# Patient Record
Sex: MALE | Race: WHITE | NOT HISPANIC OR LATINO
[De-identification: names, ages, dates, MRNs, and addresses within clinical notes are randomized per-mention and may not be internally consistent; named-entity substitution may affect disease eponyms.]

---

## 2017-10-03 ENCOUNTER — RX RENEWAL (OUTPATIENT)
Age: 37
End: 2017-10-03

## 2018-01-18 ENCOUNTER — APPOINTMENT (OUTPATIENT)
Dept: FAMILY MEDICINE | Facility: CLINIC | Age: 38
End: 2018-01-18
Payer: COMMERCIAL

## 2018-01-18 ENCOUNTER — RECORD ABSTRACTING (OUTPATIENT)
Age: 38
End: 2018-01-18

## 2018-01-18 VITALS
OXYGEN SATURATION: 97 % | DIASTOLIC BLOOD PRESSURE: 82 MMHG | HEIGHT: 71 IN | WEIGHT: 151 LBS | BODY MASS INDEX: 21.14 KG/M2 | TEMPERATURE: 99.1 F | RESPIRATION RATE: 16 BRPM | HEART RATE: 94 BPM | SYSTOLIC BLOOD PRESSURE: 158 MMHG

## 2018-01-18 DIAGNOSIS — K22.70 BARRETT'S ESOPHAGUS W/OUT DYSPLASIA: ICD-10-CM

## 2018-01-18 DIAGNOSIS — K57.90 DIVERTICULOSIS OF INTESTINE, PART UNSPECIFIED, W/OUT PERFORATION OR ABSCESS W/OUT BLEEDING: ICD-10-CM

## 2018-01-18 DIAGNOSIS — G47.33 OBSTRUCTIVE SLEEP APNEA (ADULT) (PEDIATRIC): ICD-10-CM

## 2018-01-18 DIAGNOSIS — M51.86: ICD-10-CM

## 2018-01-18 DIAGNOSIS — K21.9 GASTRO-ESOPHAGEAL REFLUX DISEASE W/OUT ESOPHAGITIS: ICD-10-CM

## 2018-01-18 DIAGNOSIS — Z80.0 FAMILY HISTORY OF MALIGNANT NEOPLASM OF DIGESTIVE ORGANS: ICD-10-CM

## 2018-01-18 LAB — RESULT: NEGATIVE

## 2018-01-18 PROCEDURE — 87880 STREP A ASSAY W/OPTIC: CPT | Mod: QW

## 2018-01-18 PROCEDURE — 99213 OFFICE O/P EST LOW 20 MIN: CPT | Mod: 25

## 2018-01-18 RX ORDER — CEPHALEXIN 500 MG/1
500 CAPSULE ORAL
Qty: 10 | Refills: 0 | Status: DISCONTINUED | COMMUNITY
Start: 2017-09-27

## 2018-01-18 RX ORDER — TIZANIDINE 4 MG/1
4 TABLET ORAL
Qty: 60 | Refills: 0 | Status: DISCONTINUED | COMMUNITY
Start: 2017-08-07 | End: 2018-01-18

## 2018-01-18 RX ORDER — DOXEPIN HYDROCHLORIDE 10 MG/1
10 CAPSULE ORAL
Qty: 60 | Refills: 0 | Status: DISCONTINUED | COMMUNITY
Start: 2017-11-27 | End: 2018-01-18

## 2018-01-18 RX ORDER — OXYCODONE HYDROCHLORIDE 15 MG/1
15 TABLET ORAL
Qty: 30 | Refills: 0 | Status: DISCONTINUED | COMMUNITY
Start: 2017-08-07 | End: 2018-01-18

## 2018-01-18 RX ORDER — TRAZODONE HYDROCHLORIDE 50 MG/1
50 TABLET ORAL
Qty: 30 | Refills: 0 | Status: DISCONTINUED | COMMUNITY
Start: 2017-06-27 | End: 2018-01-18

## 2018-01-18 RX ORDER — TRAZODONE HYDROCHLORIDE 150 MG/1
150 TABLET ORAL
Qty: 30 | Refills: 0 | Status: DISCONTINUED | COMMUNITY
Start: 2017-08-13 | End: 2018-01-18

## 2018-01-18 RX ORDER — OMEPRAZOLE 40 MG/1
40 CAPSULE, DELAYED RELEASE ORAL
Qty: 30 | Refills: 0 | Status: DISCONTINUED | COMMUNITY
Start: 2017-06-27 | End: 2018-01-18

## 2018-02-02 ENCOUNTER — RX RENEWAL (OUTPATIENT)
Age: 38
End: 2018-02-02

## 2018-04-19 ENCOUNTER — MEDICATION RENEWAL (OUTPATIENT)
Age: 38
End: 2018-04-19

## 2018-05-24 ENCOUNTER — APPOINTMENT (OUTPATIENT)
Dept: FAMILY MEDICINE | Facility: CLINIC | Age: 38
End: 2018-05-24
Payer: COMMERCIAL

## 2018-05-24 ENCOUNTER — NON-APPOINTMENT (OUTPATIENT)
Age: 38
End: 2018-05-24

## 2018-05-24 VITALS
SYSTOLIC BLOOD PRESSURE: 132 MMHG | DIASTOLIC BLOOD PRESSURE: 80 MMHG | WEIGHT: 147 LBS | OXYGEN SATURATION: 97 % | HEIGHT: 71 IN | HEART RATE: 86 BPM | BODY MASS INDEX: 20.58 KG/M2

## 2018-05-24 DIAGNOSIS — Z00.00 ENCOUNTER FOR GENERAL ADULT MEDICAL EXAMINATION W/OUT ABNORMAL FINDINGS: ICD-10-CM

## 2018-05-24 DIAGNOSIS — Z80.3 FAMILY HISTORY OF MALIGNANT NEOPLASM OF BREAST: ICD-10-CM

## 2018-05-24 DIAGNOSIS — M25.512 PAIN IN LEFT SHOULDER: ICD-10-CM

## 2018-05-24 LAB
BILIRUB UR QL STRIP: NEGATIVE
GLUCOSE UR-MCNC: NEGATIVE
HCG UR QL: 0.2 EU/DL
HGB UR QL STRIP.AUTO: NEGATIVE
KETONES UR-MCNC: NEGATIVE
LEUKOCYTE ESTERASE UR QL STRIP: NEGATIVE
NITRITE UR QL STRIP: NEGATIVE
PH UR STRIP: 6.5
PROT UR STRIP-MCNC: NEGATIVE
SP GR UR STRIP: 1.02

## 2018-05-24 PROCEDURE — 99214 OFFICE O/P EST MOD 30 MIN: CPT | Mod: 25

## 2018-05-24 PROCEDURE — 81003 URINALYSIS AUTO W/O SCOPE: CPT | Mod: QW

## 2018-05-24 NOTE — HISTORY OF PRESENT ILLNESS
[FreeTextEntry1] : pt is here for a CPE.  [de-identified] : been having some left shoulder pain for 1 day, no injury, but other than that i feel well otherwise, last PE was many years ago, have not had labs in a while.

## 2018-05-24 NOTE — HEALTH RISK ASSESSMENT
[Very Good] : ~his/her~  mood as very good [No falls in past year] : Patient reported no falls in the past year [0] : 2) Feeling down, depressed, or hopeless: Not at all (0) [Patient reported colonoscopy was normal] : Patient reported colonoscopy was normal [None] : None [With Significant Other] : lives with significant other [Employed] : employed [College] : College [] :  [Sexually Active] : sexually active [Feels Safe at Home] : Feels safe at home [Fully functional (bathing, dressing, toileting, transferring, walking, feeding)] : Fully functional (bathing, dressing, toileting, transferring, walking, feeding) [Fully functional (using the telephone, shopping, preparing meals, housekeeping, doing laundry, using] : Fully functional and needs no help or supervision to perform IADLs (using the telephone, shopping, preparing meals, housekeeping, doing laundry, using transportation, managing medications and managing finances) [Smoke Detector] : smoke detector [Carbon Monoxide Detector] : carbon monoxide detector [Guns at Home] : guns at home [Safety elements used in home] : safety elements used in home [Seat Belt] :  uses seat belt [Sunscreen] : uses sunscreen [] : No [de-identified] : ortho for neck and back problems prn.  [de-identified] : socially  [[id number]] : 0 [High Risk Behavior] : no high risk behavior [Reports changes in hearing] : Reports no changes in hearing [Reports changes in vision] : Reports no changes in vision [Reports changes in dental health] : Reports no changes in dental health [ColonoscopyDate] : 2015 [ColonoscopyComments] : sched for next week again.

## 2018-05-24 NOTE — REVIEW OF SYSTEMS
[Sore Throat] : sore throat [Diarrhea] : diarrhea [Heartburn] : heartburn [Joint Pain] : joint pain [Joint Stiffness] : joint stiffness [Insomnia] : insomnia [Anxiety] : anxiety [Negative] : Heme/Lymph [Postnasal Drip] : no postnasal drip [Nasal Discharge] : no nasal discharge [Suicidal] : not suicidal [Depression] : no depression [FreeTextEntry7] : intermittently, beign ruled out for IBS, had celiac and allgergy testing done, all normal. was tested fro chrons as well, colo was negative.  [FreeTextEntry9] : left shoulder pain

## 2018-06-01 LAB
24R-OH-CALCIDIOL SERPL-MCNC: 68.5 PG/ML
ALBUMIN SERPL ELPH-MCNC: 5 G/DL
ALP BLD-CCNC: 47 U/L
ALT SERPL-CCNC: 20 U/L
ANION GAP SERPL CALC-SCNC: 18 MMOL/L
APPEARANCE: CLEAR
AST SERPL-CCNC: 22 U/L
BACTERIA UR CULT: NORMAL
BASOPHILS # BLD AUTO: 0.02 K/UL
BASOPHILS NFR BLD AUTO: 0.3 %
BILIRUB SERPL-MCNC: 0.5 MG/DL
BILIRUBIN URINE: NEGATIVE
BLOOD URINE: NEGATIVE
BUN SERPL-MCNC: 16 MG/DL
C PNEUM IGG SER QL: ABNORMAL TITER
C PNEUM IGM SER QL: NORMAL TITER
C PSITTACI IGG SER QL: NORMAL TITER
C PSITTACI IGM SER QL: NORMAL TITER
C TRACH B IGG SER QL: NORMAL TITER
C TRACH B IGM SER QL: NORMAL TITER
CALCIUM SERPL-MCNC: 9.5 MG/DL
CHLORIDE SERPL-SCNC: 100 MMOL/L
CHOLEST SERPL-MCNC: 187 MG/DL
CHOLEST/HDLC SERPL: 1.9 RATIO
CO2 SERPL-SCNC: 24 MMOL/L
COLOR: YELLOW
CREAT SERPL-MCNC: 0.79 MG/DL
EOSINOPHIL # BLD AUTO: 0.02 K/UL
EOSINOPHIL NFR BLD AUTO: 0.3 %
ESTIMATED AVERAGE GLUCOSE: 97 MG/DL
ESTROGEN SERPL-MCNC: 102 PG/ML
FERRITIN SERPL-MCNC: 175 NG/ML
FOLATE SERPL-MCNC: 15.7 NG/ML
GLUCOSE QUALITATIVE U: NEGATIVE MG/DL
GLUCOSE SERPL-MCNC: 88 MG/DL
GONORRHOEAE AB: NORMAL
HAV IGM SER QL: NONREACTIVE
HBA1C MFR BLD HPLC: 5 %
HBV CORE IGM SER QL: NONREACTIVE
HBV SURFACE AG SER QL: NONREACTIVE
HCT VFR BLD CALC: 45.6 %
HCV AB SER QL: NONREACTIVE
HCV S/CO RATIO: 0.14 S/CO
HDLC SERPL-MCNC: 98 MG/DL
HGB BLD-MCNC: 15.1 G/DL
HIV1+2 AB SPEC QL IA.RAPID: NONREACTIVE
HSV 1+2 IGG SER IA-IMP: NEGATIVE
HSV 1+2 IGG SER IA-IMP: POSITIVE
HSV1 IGG SER QL: 19.4 INDEX
HSV1 IGM SER QL: NORMAL TITER
HSV2 AB FLD-ACNC: NORMAL TITER
HSV2 IGG SER QL: 0.33 INDEX
IMM GRANULOCYTES NFR BLD AUTO: 0.1 %
IRON SERPL-MCNC: 141 UG/DL
KETONES URINE: NEGATIVE
LDLC SERPL CALC-MCNC: 74 MG/DL
LEUKOCYTE ESTERASE URINE: NEGATIVE
LYMPHOCYTES # BLD AUTO: 0.69 K/UL
LYMPHOCYTES NFR BLD AUTO: 10.2 %
MAN DIFF?: NORMAL
MCHC RBC-ENTMCNC: 32.5 PG
MCHC RBC-ENTMCNC: 33.1 GM/DL
MCV RBC AUTO: 98.1 FL
MONOCYTES # BLD AUTO: 0.35 K/UL
MONOCYTES NFR BLD AUTO: 5.2 %
NEUTROPHILS # BLD AUTO: 5.68 K/UL
NEUTROPHILS NFR BLD AUTO: 83.9 %
NITRITE URINE: NEGATIVE
PH URINE: 6.5
PLATELET # BLD AUTO: 200 K/UL
POTASSIUM SERPL-SCNC: 4 MMOL/L
PROT SERPL-MCNC: 7.9 G/DL
PROTEIN URINE: NEGATIVE MG/DL
RBC # BLD: 4.65 M/UL
RBC # FLD: 14.5 %
SODIUM SERPL-SCNC: 142 MMOL/L
SPECIFIC GRAVITY URINE: 1.02
T PALLIDUM AB SER QL IA: NEGATIVE
T3 SERPL-MCNC: 112 NG/DL
T4 FREE SERPL-MCNC: 1.2 NG/DL
T4 SERPL-MCNC: 5.6 UG/DL
TESTOST BND SERPL-MCNC: 7 PG/ML
TESTOST SERPL-MCNC: 438.3 NG/DL
THYROGLOB AB SERPL-ACNC: <20 IU/ML
THYROPEROXIDASE AB SERPL IA-ACNC: <10 IU/ML
TRIGL SERPL-MCNC: 77 MG/DL
TSH SERPL-ACNC: 1.18 UIU/ML
UROBILINOGEN URINE: NEGATIVE MG/DL
VIT B12 SERPL-MCNC: 825 PG/ML
WBC # FLD AUTO: 6.77 K/UL

## 2018-06-20 ENCOUNTER — RX RENEWAL (OUTPATIENT)
Age: 38
End: 2018-06-20

## 2018-11-03 ENCOUNTER — MEDICATION RENEWAL (OUTPATIENT)
Age: 38
End: 2018-11-03

## 2018-11-26 ENCOUNTER — APPOINTMENT (OUTPATIENT)
Dept: FAMILY MEDICINE | Facility: CLINIC | Age: 38
End: 2018-11-26
Payer: COMMERCIAL

## 2018-11-26 VITALS
TEMPERATURE: 98.7 F | DIASTOLIC BLOOD PRESSURE: 78 MMHG | OXYGEN SATURATION: 98 % | HEART RATE: 114 BPM | SYSTOLIC BLOOD PRESSURE: 120 MMHG | RESPIRATION RATE: 16 BRPM

## 2018-11-26 LAB
FLUAV SPEC QL CULT: NORMAL
FLUBV AG SPEC QL IA: NORMAL
S PYO AG SPEC QL IA: NORMAL

## 2018-11-26 PROCEDURE — 99213 OFFICE O/P EST LOW 20 MIN: CPT | Mod: 25

## 2018-11-26 PROCEDURE — 87804 INFLUENZA ASSAY W/OPTIC: CPT | Mod: QW

## 2018-11-26 PROCEDURE — 87880 STREP A ASSAY W/OPTIC: CPT | Mod: QW

## 2018-11-26 RX ORDER — AMOXICILLIN AND CLAVULANATE POTASSIUM 875; 125 MG/1; MG/1
875-125 TABLET, COATED ORAL
Qty: 20 | Refills: 0 | Status: DISCONTINUED | OUTPATIENT
Start: 2018-01-18 | End: 2018-11-26

## 2018-11-26 RX ORDER — BUPROPION HYDROCHLORIDE 300 MG/1
300 TABLET, EXTENDED RELEASE ORAL
Qty: 30 | Refills: 0 | Status: DISCONTINUED | COMMUNITY
Start: 2018-09-19 | End: 2018-11-26

## 2018-11-26 RX ORDER — RAMELTEON 8 MG/1
8 TABLET, FILM COATED ORAL
Qty: 30 | Refills: 0 | Status: DISCONTINUED | COMMUNITY
Start: 2018-10-03 | End: 2018-11-26

## 2018-11-26 RX ORDER — MELOXICAM 15 MG/1
15 TABLET ORAL DAILY
Qty: 30 | Refills: 0 | Status: DISCONTINUED | COMMUNITY
Start: 2018-05-24 | End: 2018-11-26

## 2018-11-26 RX ORDER — AMOXICILLIN AND CLAVULANATE POTASSIUM 875; 125 MG/1; MG/1
875-125 TABLET, COATED ORAL
Qty: 20 | Refills: 0 | Status: COMPLETED | COMMUNITY
Start: 2018-11-26 | End: 2018-12-06

## 2018-11-26 RX ORDER — VALACYCLOVIR 1 G/1
1 TABLET, FILM COATED ORAL
Qty: 20 | Refills: 2 | Status: DISCONTINUED | COMMUNITY
Start: 2017-10-03 | End: 2018-11-26

## 2018-11-26 RX ORDER — ERYTHROMYCIN 5 MG/G
5 OINTMENT OPHTHALMIC
Qty: 3 | Refills: 0 | Status: DISCONTINUED | COMMUNITY
Start: 2018-11-16 | End: 2018-11-26

## 2018-11-26 NOTE — HISTORY OF PRESENT ILLNESS
[FreeTextEntry8] : pt c/o congestion +ha +ear aches +rn/pnd +sweats +temp +body aches +heart "stabbing pain" +cough x 3 days.  He has sharp pain in the left side of his chest especially when taking a deep breath.  He has had a harsh productive cough.  He has a lot of pain in his head and ears.  He had a temp of 101 last night.  He is taking theraflu

## 2018-11-26 NOTE — PHYSICAL EXAM
[No Acute Distress] : no acute distress [Well Developed] : well developed [Ill-Appearing] : ill-appearing [No Respiratory Distress] : no respiratory distress  [Clear to Auscultation] : lungs were clear to auscultation bilaterally [No Accessory Muscle Use] : no accessory muscle use [Normal Rate] : normal rate  [Regular Rhythm] : with a regular rhythm [Normal S1, S2] : normal S1 and S2 [No Murmur] : no murmur heard [Speech Grossly Normal] : speech grossly normal [Memory Grossly Normal] : memory grossly normal [Normal Affect] : the affect was normal [Normal Mood] : the mood was normal [Normal Insight/Judgement] : insight and judgment were intact [de-identified] : bilateral conjunctival injection, worse in left eye [de-identified] : erythema of bilateral TM's, erythema of posterior pharynx [de-identified] : anterior adenopathy

## 2018-11-26 NOTE — REVIEW OF SYSTEMS
[Fever] : fever [Chills] : chills [Fatigue] : fatigue [Hot Flashes] : hot flashes [Night Sweats] : night sweats [Earache] : earache [Postnasal Drip] : postnasal drip [Nasal Discharge] : nasal discharge [Sore Throat] : sore throat [Hoarseness] : hoarseness [Chest Pain] : chest pain [Shortness Of Breath] : shortness of breath [Cough] : cough [Muscle Pain] : muscle pain [Headache] : headache [Negative] : Heme/Lymph [Wheezing] : no wheezing [Dyspnea on Exertion] : not dyspnea on exertion

## 2018-11-26 NOTE — ASSESSMENT
[FreeTextEntry1] : he was advised to rest and drink plenty of fluids and to take augmentin as directed and follow up in 3 days if unimproved

## 2019-01-17 ENCOUNTER — RX RENEWAL (OUTPATIENT)
Age: 39
End: 2019-01-17

## 2019-06-11 ENCOUNTER — RX RENEWAL (OUTPATIENT)
Age: 39
End: 2019-06-11

## 2019-06-18 ENCOUNTER — APPOINTMENT (OUTPATIENT)
Dept: FAMILY MEDICINE | Facility: CLINIC | Age: 39
End: 2019-06-18
Payer: COMMERCIAL

## 2019-06-18 VITALS
SYSTOLIC BLOOD PRESSURE: 110 MMHG | RESPIRATION RATE: 14 BRPM | BODY MASS INDEX: 21.42 KG/M2 | HEIGHT: 71 IN | DIASTOLIC BLOOD PRESSURE: 62 MMHG | HEART RATE: 78 BPM | OXYGEN SATURATION: 98 % | WEIGHT: 153 LBS

## 2019-06-18 DIAGNOSIS — R79.89 OTHER SPECIFIED ABNORMAL FINDINGS OF BLOOD CHEMISTRY: ICD-10-CM

## 2019-06-18 DIAGNOSIS — R73.03 PREDIABETES.: ICD-10-CM

## 2019-06-18 DIAGNOSIS — R53.83 OTHER FATIGUE: ICD-10-CM

## 2019-06-18 PROCEDURE — 99213 OFFICE O/P EST LOW 20 MIN: CPT

## 2019-06-18 NOTE — ASSESSMENT
[FreeTextEntry1] : 1. to 4.  As he is not fasting lab to be scheduled.  Diet/vitamin intake reviewed.

## 2019-06-18 NOTE — HISTORY OF PRESENT ILLNESS
[FreeTextEntry1] : pt presents for blood work  [de-identified] : He was told he had low  WBC's and  RBC's  as well as prediabetic.

## 2019-06-18 NOTE — HEALTH RISK ASSESSMENT
[No falls in past year] : Patient reported no falls in the past year [] : Yes [0] : 2) Feeling down, depressed, or hopeless: Not at all (0) [[id number]] : 0 [de-identified] : no special diet [de-identified] : no regular exercise

## 2019-06-18 NOTE — PHYSICAL EXAM
[No Acute Distress] : no acute distress [Well Nourished] : well nourished [Well Developed] : well developed [No JVD] : no jugular venous distention [Well-Appearing] : well-appearing [No Respiratory Distress] : no respiratory distress  [Supple] : supple [No Lymphadenopathy] : no lymphadenopathy [Clear to Auscultation] : lungs were clear to auscultation bilaterally [Normal Rate] : normal rate  [Regular Rhythm] : with a regular rhythm [No Murmur] : no murmur heard [No Carotid Bruits] : no carotid bruits [Normal Anterior Cervical Nodes] : no anterior cervical lymphadenopathy [Normal Posterior Cervical Nodes] : no posterior cervical lymphadenopathy [Normal Affect] : the affect was normal [Alert and Oriented x3] : oriented to person, place, and time [Memory Grossly Normal] : memory grossly normal [Speech Grossly Normal] : speech grossly normal [Normal Mood] : the mood was normal [Normal Insight/Judgement] : insight and judgment were intact

## 2019-07-12 ENCOUNTER — APPOINTMENT (OUTPATIENT)
Dept: FAMILY MEDICINE | Facility: CLINIC | Age: 39
End: 2019-07-12
Payer: COMMERCIAL

## 2019-07-12 VITALS
SYSTOLIC BLOOD PRESSURE: 132 MMHG | OXYGEN SATURATION: 96 % | RESPIRATION RATE: 14 BRPM | DIASTOLIC BLOOD PRESSURE: 84 MMHG | HEIGHT: 71 IN | HEART RATE: 92 BPM | BODY MASS INDEX: 20.72 KG/M2 | WEIGHT: 148 LBS | TEMPERATURE: 99.1 F

## 2019-07-12 DIAGNOSIS — Z87.09 PERSONAL HISTORY OF OTHER DISEASES OF THE RESPIRATORY SYSTEM: ICD-10-CM

## 2019-07-12 LAB — S PYO AG SPEC QL IA: NEGATIVE

## 2019-07-12 PROCEDURE — 99213 OFFICE O/P EST LOW 20 MIN: CPT | Mod: 25

## 2019-07-12 PROCEDURE — 87880 STREP A ASSAY W/OPTIC: CPT | Mod: QW

## 2019-07-12 NOTE — ASSESSMENT
[FreeTextEntry1] : start abx - take with food\par Rest, fluids, vitamin C, salt water gargles, tea with honey.\par     - patient instructed to RTO if symptoms worsen or persist, if fevers develop, does not get better in 7 days.\par [facility] labs from 6/27/19 reviewed with patient - WBC low - pt advised to repeat when he is feeling better, labs otherwise stable. pt verbalized understanding.

## 2019-07-12 NOTE — HISTORY OF PRESENT ILLNESS
[FreeTextEntry8] : ST, +swollen gland on right side, +body aches, +chills and sweats, +suppressed appetite, x 1 day. Denies known fevers, shortness of breath, cough, sinus pressure. Pt also requesting STD panel since he has a new partner.

## 2019-07-12 NOTE — PHYSICAL EXAM
[Well Developed] : well developed [Well Nourished] : well nourished [No Acute Distress] : no acute distress [Ill-Appearing] : ill-appearing [Normal Sclera/Conjunctiva] : normal sclera/conjunctiva [Normal Outer Ear/Nose] : the outer ears and nose were normal in appearance [Normal] : affect was normal and insight and judgment were intact [Normal TMs] : both tympanic membranes were normal [Normal Nasal Mucosa] : the nasal mucosa was normal [de-identified] : moderate cervical adenopathy [de-identified] : posterior oropharynx beefy red, no exudates seen on inspection, thick white pnd seen on inspection

## 2019-07-22 ENCOUNTER — RESULT REVIEW (OUTPATIENT)
Age: 39
End: 2019-07-22

## 2019-07-29 ENCOUNTER — APPOINTMENT (OUTPATIENT)
Dept: FAMILY MEDICINE | Facility: CLINIC | Age: 39
End: 2019-07-29

## 2019-08-05 ENCOUNTER — APPOINTMENT (OUTPATIENT)
Dept: FAMILY MEDICINE | Facility: CLINIC | Age: 39
End: 2019-08-05
Payer: COMMERCIAL

## 2019-08-05 VITALS
RESPIRATION RATE: 14 BRPM | SYSTOLIC BLOOD PRESSURE: 130 MMHG | DIASTOLIC BLOOD PRESSURE: 80 MMHG | OXYGEN SATURATION: 98 % | HEART RATE: 90 BPM

## 2019-08-05 DIAGNOSIS — N52.9 MALE ERECTILE DYSFUNCTION, UNSPECIFIED: ICD-10-CM

## 2019-08-05 DIAGNOSIS — Z87.19 PERSONAL HISTORY OF OTHER DISEASES OF THE DIGESTIVE SYSTEM: ICD-10-CM

## 2019-08-05 DIAGNOSIS — H66.93 OTITIS MEDIA, UNSPECIFIED, BILATERAL: ICD-10-CM

## 2019-08-05 PROCEDURE — 99213 OFFICE O/P EST LOW 20 MIN: CPT | Mod: 25

## 2019-08-05 PROCEDURE — 36415 COLL VENOUS BLD VENIPUNCTURE: CPT

## 2019-08-05 RX ORDER — AMOXICILLIN AND CLAVULANATE POTASSIUM 875; 125 MG/1; MG/1
875-125 TABLET, COATED ORAL
Qty: 20 | Refills: 0 | Status: DISCONTINUED | COMMUNITY
Start: 2019-07-12 | End: 2019-08-05

## 2019-08-05 NOTE — REVIEW OF SYSTEMS
[Dizziness] : dizziness [Insomnia] : insomnia [Anxiety] : anxiety [Depression] : depression [Negative] : Heme/Lymph [Recent Change In Weight] : ~T no recent weight change [Skin Rash] : no skin rash [Suicidal] : not suicidal [FreeTextEntry8] : burning in genital area [FreeTextEntry3] : dry mouth

## 2019-08-05 NOTE — PHYSICAL EXAM
[No Acute Distress] : no acute distress [Well Developed] : well developed [Well Nourished] : well nourished [Well-Appearing] : well-appearing [de-identified] : he is very anxious

## 2019-08-05 NOTE — HISTORY OF PRESENT ILLNESS
[FreeTextEntry1] : pt presents for follow up  [de-identified] : For the past few months he has a dry mouth with tingling in his mouth.  He has burning in his genital area without any rash.  He is going through a divorce.  He is very anxious and is seeing clonazepam, wellbutrin and trazodone.  He is taking 500 mg of wellbutrin daily.  He has a new partner and is having trouble having erections and this is very unusual for him.  He is very anxious about it.  He had labs that showed his free testosterone is low.  He is scheduled to see an endocrinologist.

## 2019-08-05 NOTE — PLAN
[FreeTextEntry1] : labs will be drawn in the office today to further assess his health and current symptoms, he was given a trial of cialis, he was given emotional support and was advised to decrease the bupropion to 300 mg daily and to follow up with his psychiatrist for further medication management, he will be called with results

## 2019-08-06 ENCOUNTER — RX RENEWAL (OUTPATIENT)
Age: 39
End: 2019-08-06

## 2019-08-08 LAB
ALBUMIN SERPL ELPH-MCNC: 4.9 G/DL
ALP BLD-CCNC: 48 U/L
ALT SERPL-CCNC: 33 U/L
ANION GAP SERPL CALC-SCNC: 11 MMOL/L
AST SERPL-CCNC: 32 U/L
BASOPHILS # BLD AUTO: 0.02 K/UL
BASOPHILS NFR BLD AUTO: 0.5 %
BILIRUB SERPL-MCNC: 0.3 MG/DL
BUN SERPL-MCNC: 12 MG/DL
CALCIUM SERPL-MCNC: 9.6 MG/DL
CHLORIDE SERPL-SCNC: 103 MMOL/L
CO2 SERPL-SCNC: 27 MMOL/L
CREAT SERPL-MCNC: 0.71 MG/DL
EOSINOPHIL # BLD AUTO: 0.03 K/UL
EOSINOPHIL NFR BLD AUTO: 0.7 %
ESTIMATED AVERAGE GLUCOSE: 94 MG/DL
FERRITIN SERPL-MCNC: 157 NG/ML
FOLATE SERPL-MCNC: 10.3 NG/ML
GLUCOSE SERPL-MCNC: 90 MG/DL
HBA1C MFR BLD HPLC: 4.9 %
HCT VFR BLD CALC: 45.5 %
HGB BLD-MCNC: 14.9 G/DL
IMM GRANULOCYTES NFR BLD AUTO: 0.2 %
LYMPHOCYTES # BLD AUTO: 0.85 K/UL
LYMPHOCYTES NFR BLD AUTO: 21.1 %
MAN DIFF?: NORMAL
MCHC RBC-ENTMCNC: 32.7 GM/DL
MCHC RBC-ENTMCNC: 33.8 PG
MCV RBC AUTO: 103.2 FL
MONOCYTES # BLD AUTO: 0.52 K/UL
MONOCYTES NFR BLD AUTO: 12.9 %
NEUTROPHILS # BLD AUTO: 2.6 K/UL
NEUTROPHILS NFR BLD AUTO: 64.6 %
PLATELET # BLD AUTO: 244 K/UL
POTASSIUM SERPL-SCNC: 4.5 MMOL/L
PROT SERPL-MCNC: 7.2 G/DL
RBC # BLD: 4.41 M/UL
RBC # FLD: 13.4 %
SODIUM SERPL-SCNC: 141 MMOL/L
T3FREE SERPL-MCNC: 3.46 PG/ML
T4 FREE SERPL-MCNC: 1.4 NG/DL
T4BG SERPL-MCNC: 10 UG/ML
THYROPEROXIDASE AB SERPL IA-ACNC: <10 IU/ML
TSH SERPL-ACNC: 2.37 UIU/ML
TSI ACT/NOR SER: <0.1 IU/L
VIT B12 SERPL-MCNC: 354 PG/ML
WBC # FLD AUTO: 4.03 K/UL

## 2019-08-23 ENCOUNTER — APPOINTMENT (OUTPATIENT)
Dept: FAMILY MEDICINE | Facility: CLINIC | Age: 39
End: 2019-08-23
Payer: COMMERCIAL

## 2019-08-23 VITALS
BODY MASS INDEX: 21.7 KG/M2 | WEIGHT: 155 LBS | RESPIRATION RATE: 16 BRPM | OXYGEN SATURATION: 98 % | HEIGHT: 71 IN | DIASTOLIC BLOOD PRESSURE: 82 MMHG | SYSTOLIC BLOOD PRESSURE: 132 MMHG | HEART RATE: 98 BPM

## 2019-08-23 VITALS — TEMPERATURE: 98.3 F

## 2019-08-23 DIAGNOSIS — R59.9 ENLARGED LYMPH NODES, UNSPECIFIED: ICD-10-CM

## 2019-08-23 PROCEDURE — 99213 OFFICE O/P EST LOW 20 MIN: CPT

## 2019-08-23 RX ORDER — BUPROPION HYDROCHLORIDE 300 MG/1
300 TABLET, EXTENDED RELEASE ORAL
Refills: 0 | Status: DISCONTINUED | COMMUNITY
End: 2019-08-23

## 2019-08-23 NOTE — REVIEW OF SYSTEMS
[Chills] : chills [Redness] : redness [Itching] : itching [Sore Throat] : sore throat [Skin Rash] : skin rash [Anxiety] : anxiety [Negative] : Neurological [Wheezing] : no wheezing [Cough] : no cough

## 2019-08-23 NOTE — PHYSICAL EXAM
[Well Nourished] : well nourished [No Acute Distress] : no acute distress [Well-Appearing] : well-appearing [Well Developed] : well developed [Normal Voice/Communication] : normal voice/communication [Normal Outer Ear/Nose] : the outer ears and nose were normal in appearance [No Lymphadenopathy] : no lymphadenopathy [Normal TMs] : both tympanic membranes were normal [No Accessory Muscle Use] : no accessory muscle use [No Respiratory Distress] : no respiratory distress  [Supple] : supple [Clear to Auscultation] : lungs were clear to auscultation bilaterally [Normal Rate] : normal rate  [Normal S1, S2] : normal S1 and S2 [Regular Rhythm] : with a regular rhythm [de-identified] : mild conjunctival injection [de-identified] : minimal erythema of posterior pharynx [de-identified] : erythema under penis and on anterior scrotum, no vesicles or open sores or pustules [de-identified] : he is anxious

## 2019-08-23 NOTE — PLAN
[FreeTextEntry1] : he was advised to apply nystop powder bid and to use cornstarche in between, he was reassured and asked for a renewal of tadalafil and will follow up if symptoms persist or worsen

## 2019-08-23 NOTE — HISTORY OF PRESENT ILLNESS
[FreeTextEntry8] : Patient presents for sore throat, itchy watery eyes and jock itch x 1 week.  He has an itchy rash under his penis and it started after having intercourse.  He has itchy eyes.  He had a sore throat but it improved.  He did have chills but that went away.  He denies any cough.

## 2019-08-30 ENCOUNTER — RX RENEWAL (OUTPATIENT)
Age: 39
End: 2019-08-30

## 2019-08-30 ENCOUNTER — CLINICAL ADVICE (OUTPATIENT)
Age: 39
End: 2019-08-30

## 2019-10-29 ENCOUNTER — RX RENEWAL (OUTPATIENT)
Age: 39
End: 2019-10-29

## 2019-11-25 ENCOUNTER — RX RENEWAL (OUTPATIENT)
Age: 39
End: 2019-11-25

## 2020-01-18 ENCOUNTER — RX RENEWAL (OUTPATIENT)
Age: 40
End: 2020-01-18

## 2020-01-29 ENCOUNTER — APPOINTMENT (OUTPATIENT)
Dept: FAMILY MEDICINE | Facility: CLINIC | Age: 40
End: 2020-01-29
Payer: COMMERCIAL

## 2020-01-29 VITALS
HEART RATE: 89 BPM | RESPIRATION RATE: 16 BRPM | HEIGHT: 71 IN | DIASTOLIC BLOOD PRESSURE: 64 MMHG | BODY MASS INDEX: 21.7 KG/M2 | OXYGEN SATURATION: 98 % | SYSTOLIC BLOOD PRESSURE: 122 MMHG | WEIGHT: 155 LBS

## 2020-01-29 DIAGNOSIS — Z87.09 PERSONAL HISTORY OF OTHER DISEASES OF THE RESPIRATORY SYSTEM: ICD-10-CM

## 2020-01-29 DIAGNOSIS — Z86.2 PERSONAL HISTORY OF DISEASES OF THE BLOOD AND BLOOD-FORMING ORGANS AND CERTAIN DISORDERS INVOLVING THE IMMUNE MECHANISM: ICD-10-CM

## 2020-01-29 DIAGNOSIS — Z79.899 OTHER LONG TERM (CURRENT) DRUG THERAPY: ICD-10-CM

## 2020-01-29 DIAGNOSIS — Z11.3 ENCOUNTER FOR SCREENING FOR INFECTIONS WITH A PREDOMINANTLY SEXUAL MODE OF TRANSMISSION: ICD-10-CM

## 2020-01-29 DIAGNOSIS — R20.2 PARESTHESIA OF SKIN: ICD-10-CM

## 2020-01-29 PROCEDURE — 99213 OFFICE O/P EST LOW 20 MIN: CPT

## 2020-01-29 NOTE — HISTORY OF PRESENT ILLNESS
[FreeTextEntry1] : The patient's psychiatrist closed unexpectedly, needs renewal of medications [de-identified] : He was under the care of Dr Figueroa who retired and the new physician who took over just left the practice.  He went through a divorce and is in a good relationship now and his girlfriend is expecting a baby and he is very happy.  He would like to have his medications filled by me from now on.

## 2020-01-29 NOTE — REVIEW OF SYSTEMS
[Negative] : Neurological [Suicidal] : not suicidal [Insomnia] : no insomnia [Depression] : no depression [Anxiety] : no anxiety

## 2020-01-29 NOTE — COUNSELING
[AUDIT-C Screening administered and reviewed] : AUDIT-C Screening administered and reviewed [Hazards of at-risk alcohol use discussed] : Hazards of at-risk alcohol use discussed

## 2020-01-29 NOTE — PHYSICAL EXAM
[No Acute Distress] : no acute distress [Well Nourished] : well nourished [Well-Appearing] : well-appearing [Well Developed] : well developed [Normal Voice/Communication] : normal voice/communication [No Focal Deficits] : no focal deficits [Speech Grossly Normal] : speech grossly normal [Coordination Grossly Intact] : coordination grossly intact [Memory Grossly Normal] : memory grossly normal [Normal Mood] : the mood was normal [Normal Affect] : the affect was normal [Normal Insight/Judgement] : insight and judgment were intact

## 2020-01-29 NOTE — PLAN
[FreeTextEntry1] : decreased alcohol intake was advised, I renewed medications and checked I-STOP, he will follow up monthly

## 2020-02-29 ENCOUNTER — RX RENEWAL (OUTPATIENT)
Age: 40
End: 2020-02-29

## 2020-03-03 ENCOUNTER — APPOINTMENT (OUTPATIENT)
Dept: FAMILY MEDICINE | Facility: CLINIC | Age: 40
End: 2020-03-03
Payer: COMMERCIAL

## 2020-03-03 VITALS
WEIGHT: 155 LBS | RESPIRATION RATE: 12 BRPM | OXYGEN SATURATION: 98 % | HEIGHT: 71 IN | BODY MASS INDEX: 21.7 KG/M2 | HEART RATE: 78 BPM | DIASTOLIC BLOOD PRESSURE: 80 MMHG | SYSTOLIC BLOOD PRESSURE: 120 MMHG

## 2020-03-03 VITALS — TEMPERATURE: 98.1 F

## 2020-03-03 PROCEDURE — 99213 OFFICE O/P EST LOW 20 MIN: CPT

## 2020-03-03 RX ORDER — BUPROPION HYDROCHLORIDE 100 MG/1
100 TABLET, FILM COATED ORAL DAILY
Refills: 0 | Status: DISCONTINUED | COMMUNITY
Start: 2018-11-06 | End: 2020-03-03

## 2020-03-03 NOTE — PHYSICAL EXAM
[No Lymphadenopathy] : no lymphadenopathy [Supple] : supple [Normal] : normal rate, regular rhythm, normal S1 and S2 and no murmur heard [No Edema] : there was no peripheral edema [No Focal Deficits] : no focal deficits [Normal Affect] : the affect was normal [Normal Mood] : the mood was normal [Normal Insight/Judgement] : insight and judgment were intact [de-identified] : many tattoos on body

## 2020-03-03 NOTE — REVIEW OF SYSTEMS
[Anxiety] : anxiety [FreeTextEntry2] : difficulty sleeping  [de-identified] : anxiety over wife having a child

## 2020-03-03 NOTE — HISTORY OF PRESENT ILLNESS
[FreeTextEntry1] : pt presents for med check  [de-identified] : 38yo male presents for anxiety med refill\par he is feeling well overall\par he is anxious over his first baby coming in august\par denies feeling down or depressed\par \par asked about vaccines for children if they are good to have or not , advised yes\par \par

## 2020-03-03 NOTE — PLAN
[FreeTextEntry1] : anxiety\par refilled buproprion\par refilled clonazepam he takes sparinly for sleep only\par Reference #: [id number] \par phq9= 5 but denies depression seems sleep related given answers on question[facility]\par trazodone already refilled in jan w/refills\par \par pt was asking if child should have vaccines, advised yes it is a good idea \par f/u 1 month pt agreed w/plan

## 2020-04-20 ENCOUNTER — RX RENEWAL (OUTPATIENT)
Age: 40
End: 2020-04-20

## 2020-04-21 ENCOUNTER — APPOINTMENT (OUTPATIENT)
Dept: FAMILY MEDICINE | Facility: CLINIC | Age: 40
End: 2020-04-21
Payer: COMMERCIAL

## 2020-04-21 PROCEDURE — 99213 OFFICE O/P EST LOW 20 MIN: CPT | Mod: 95

## 2020-04-21 NOTE — REVIEW OF SYSTEMS
[Anxiety] : anxiety [Insomnia] : insomnia [Depression] : depression [Negative] : Heme/Lymph [Suicidal] : not suicidal

## 2020-04-21 NOTE — PLAN
[FreeTextEntry1] : after checking I-STOP I renewed the clonazepam, he will take his other medications without change and follow up in 1 month

## 2020-04-21 NOTE — PHYSICAL EXAM
[No Acute Distress] : no acute distress [Well Nourished] : well nourished [Well Developed] : well developed [Normal Voice/Communication] : normal voice/communication [Well-Appearing] : well-appearing [Normal Sclera/Conjunctiva] : normal sclera/conjunctiva [No Respiratory Distress] : no respiratory distress  [Memory Grossly Normal] : memory grossly normal [Speech Grossly Normal] : speech grossly normal [Normal Mood] : the mood was normal [Normal Affect] : the affect was normal [Normal Insight/Judgement] : insight and judgment were intact

## 2020-04-21 NOTE — HISTORY OF PRESENT ILLNESS
[Home] : at home, [unfilled] , at the time of the visit. [Medical Office: ([facility])___] : at the medical office located in  [Patient] : the patient [Self] : self [FreeTextEntry1] : He presents for a follow up on anxiety. [de-identified] : After receiving verbal consent the visit was performed virtually via [employer] as the patient was unable to be seen in the office due to the COVID 19 pandemic.  He has been a little more anxious since the pandemic started.  A coworker just tested positive for COVID 19.  Overall he feels that his medication doses are helping.  He is tolerating them without side effects.\par

## 2020-05-08 ENCOUNTER — RX RENEWAL (OUTPATIENT)
Age: 40
End: 2020-05-08

## 2020-05-08 ENCOUNTER — APPOINTMENT (OUTPATIENT)
Dept: FAMILY MEDICINE | Facility: CLINIC | Age: 40
End: 2020-05-08
Payer: COMMERCIAL

## 2020-05-08 PROCEDURE — 99441: CPT

## 2020-05-08 RX ORDER — KETOROLAC TROMETHAMINE 5 MG/ML
0.5 SOLUTION OPHTHALMIC
Qty: 5 | Refills: 0 | Status: DISCONTINUED | COMMUNITY
Start: 2020-01-13 | End: 2020-05-08

## 2020-05-08 RX ORDER — TOBRAMYCIN AND DEXAMETHASONE 3; 1 MG/ML; MG/ML
0.3-0.1 SUSPENSION/ DROPS OPHTHALMIC
Qty: 5 | Refills: 0 | Status: DISCONTINUED | COMMUNITY
Start: 2020-01-13 | End: 2020-05-08

## 2020-05-08 RX ORDER — NEOMYCIN AND POLYMYXIN B SULFATES AND DEXAMETHASONE 3.5; 10000; 1 MG/G; [IU]/G; MG/G
3.5-10000-0.1 OINTMENT OPHTHALMIC
Qty: 4 | Refills: 0 | Status: DISCONTINUED | COMMUNITY
Start: 2020-01-13 | End: 2020-05-08

## 2020-05-08 NOTE — REVIEW OF SYSTEMS
[Fever] : fever [Chills] : chills [Diarrhea] : diarrhea [Nausea] : nausea [Vomiting] : vomiting [Muscle Pain] : muscle pain [Negative] : Heme/Lymph [de-identified] : tick bite

## 2020-05-08 NOTE — HISTORY OF PRESENT ILLNESS
[Verbal consent obtained from patient] : the patient, [unfilled] [FreeTextEntry8] : The patient telephoned and requested a call back to discuss their health.  The visit was performed over the telephone as the patient was unable to be seen in the office due to the COVID 19 pandemic.  Earlier in the week he had nausea, vomiting and diarrhea and body aches along with a fever and he was seen at [facility] MD yesterday and tested for COVID 19.  Last night he found a tick embedded in his scrotum.  He was able to remove it and sees a small white dot in it's center.\par

## 2020-05-08 NOTE — PLAN
[FreeTextEntry1] : [facility] for [facility] insect ID and tick panel, after the labs are drawn he will start doxycycline 100 mg bid, he won't take the 50 mg rx that he has from the dermatologist, he will be notified of the results

## 2020-05-12 ENCOUNTER — APPOINTMENT (OUTPATIENT)
Dept: FAMILY MEDICINE | Facility: CLINIC | Age: 40
End: 2020-05-12
Payer: COMMERCIAL

## 2020-05-12 PROCEDURE — 99213 OFFICE O/P EST LOW 20 MIN: CPT | Mod: 95

## 2020-05-12 NOTE — HISTORY OF PRESENT ILLNESS
[Home] : at home, [unfilled] , at the time of the visit. [Medical Office: ([facility])___] : at the medical office located in  [Patient] : the patient [Self] : self [FreeTextEntry1] : Pt presents for a follow up on his insomnia and anxiety and the symptoms he had last week. [de-identified] : After receiving verbal consent the visit was performed virtually via [facility] as the patient was unable to be seen in the office due to the COVID 19 pandemic.  The Covid antigen test was negative.  He is feeling better as far as the GI symptoms.  He submitted the tick to the lab and had the labs drawn and is taking the doxycycline.  He is sleeping well and his mood is stable.\par

## 2020-05-12 NOTE — PLAN
[FreeTextEntry1] : he was advised to continue the doxycycline and I will call him with lab results, he will call next week for the clonazepam renewal and follow up in 5 weeks or sooner prn

## 2020-05-12 NOTE — REVIEW OF SYSTEMS
[Fatigue] : fatigue [Insomnia] : insomnia [Anxiety] : anxiety [Negative] : Heme/Lymph [Fever] : no fever [Chills] : no chills [Suicidal] : not suicidal [Depression] : no depression [de-identified] : tick bite on scrotum

## 2020-05-14 RX ORDER — EPINEPHRINE 0.3 MG/.3ML
0.3 INJECTION INTRAMUSCULAR
Qty: 2 | Refills: 3 | Status: ACTIVE | COMMUNITY
Start: 2020-05-14 | End: 1900-01-01

## 2020-05-23 ENCOUNTER — NON-APPOINTMENT (OUTPATIENT)
Age: 40
End: 2020-05-23

## 2020-06-03 ENCOUNTER — RX RENEWAL (OUTPATIENT)
Age: 40
End: 2020-06-03

## 2020-07-01 ENCOUNTER — RX RENEWAL (OUTPATIENT)
Age: 40
End: 2020-07-01

## 2020-07-14 ENCOUNTER — APPOINTMENT (OUTPATIENT)
Dept: FAMILY MEDICINE | Facility: CLINIC | Age: 40
End: 2020-07-14
Payer: COMMERCIAL

## 2020-07-14 DIAGNOSIS — F33.9 MAJOR DEPRESSIVE DISORDER, RECURRENT, UNSPECIFIED: ICD-10-CM

## 2020-07-14 PROCEDURE — 99442: CPT

## 2020-07-14 NOTE — ASSESSMENT
[FreeTextEntry1] : - pt improving symptomatically, continue colchicine and ibuprofen, follow up with cardio in 1 month \par \par - Patient advised of harmful side effects and risk of dependence with long term use of benzodiazepines. Advised caution related to sedative effect and and respiratory depression. Advised not to take while driving or in combination with alcohol. Meditation, mindfulness, exercise, and psychotherapy encouraged. ISTOP checked. Reference #: [id number]  \par \par

## 2020-07-14 NOTE — HISTORY OF PRESENT ILLNESS
[Home] : at home, [unfilled] , at the time of the visit. [Medical Office: ([facility])___] : at the medical office located in  [Verbal consent obtained from patient] : the patient, [unfilled] [FreeTextEntry1] : HFU, med renewal  [de-identified] : Patient presents today for hospital follow up for pericarditis and medication renewal. Patient was admitted 7/3-7/6 in [facility] for pericarditis. States he has since followed up with cardiology. He originally stopped colchicine, unable to tolerate d/t diarrhea. He is now tolerating taking 1/2 dose daily. He is also taking ibuprofen. He states his symptoms are much improved, he only has minimal chest pain that is worse with laying down. He is requesting renewal of bupropion and Klonopin. Reports tolerating well tolerating well without side effects or aberrant behaviors.

## 2020-09-05 ENCOUNTER — APPOINTMENT (OUTPATIENT)
Dept: FAMILY MEDICINE | Facility: CLINIC | Age: 40
End: 2020-09-05
Payer: COMMERCIAL

## 2020-09-05 VITALS
BODY MASS INDEX: 23.8 KG/M2 | RESPIRATION RATE: 14 BRPM | DIASTOLIC BLOOD PRESSURE: 80 MMHG | WEIGHT: 170 LBS | OXYGEN SATURATION: 98 % | TEMPERATURE: 98.1 F | HEIGHT: 71 IN | SYSTOLIC BLOOD PRESSURE: 110 MMHG | HEART RATE: 68 BPM
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
## 2020-09-05 DIAGNOSIS — Z09 ENCOUNTER FOR FOLLOW-UP EXAMINATION AFTER COMPLETED TREATMENT FOR CONDITIONS OTHER THAN MALIGNANT NEOPLASM: ICD-10-CM

## 2020-09-05 DIAGNOSIS — Z86.79 PERSONAL HISTORY OF OTHER DISEASES OF THE CIRCULATORY SYSTEM: ICD-10-CM

## 2020-09-05 DIAGNOSIS — S30.863A INSECT BITE (NONVENOMOUS) OF SCROTUM AND TESTES, INITIAL ENCOUNTER: ICD-10-CM

## 2020-09-05 PROCEDURE — 99213 OFFICE O/P EST LOW 20 MIN: CPT

## 2020-09-05 RX ORDER — DOXYCYCLINE HYCLATE 50 MG/1
50 TABLET, FILM COATED ORAL
Qty: 30 | Refills: 0 | Status: DISCONTINUED | COMMUNITY
Start: 2019-10-17 | End: 2020-09-05

## 2020-09-05 RX ORDER — DOXYCYCLINE HYCLATE 100 MG/1
100 CAPSULE ORAL
Qty: 28 | Refills: 0 | Status: DISCONTINUED | COMMUNITY
Start: 2020-05-08 | End: 2020-09-05

## 2020-09-05 RX ORDER — VALACYCLOVIR 500 MG/1
500 TABLET, FILM COATED ORAL DAILY
Qty: 90 | Refills: 0 | Status: DISCONTINUED | COMMUNITY
Start: 2018-05-24 | End: 2020-09-05

## 2020-09-05 NOTE — PHYSICAL EXAM
[No Acute Distress] : no acute distress [Well Nourished] : well nourished [Well-Appearing] : well-appearing [Normal Voice/Communication] : normal voice/communication [Well Developed] : well developed [Coordination Grossly Intact] : coordination grossly intact [Speech Grossly Normal] : speech grossly normal [Memory Grossly Normal] : memory grossly normal [Normal Affect] : the affect was normal [Normal Mood] : the mood was normal [Normal Insight/Judgement] : insight and judgment were intact [de-identified] : extreme tenderness on palpation of posterior distal neck and upper back, decreased ROM of C spine

## 2020-09-05 NOTE — REVIEW OF SYSTEMS
[Anxiety] : anxiety [Insomnia] : insomnia [Negative] : Heme/Lymph [Suicidal] : not suicidal [Depression] : no depression [FreeTextEntry9] : neck and upper back pain

## 2020-09-05 NOTE — PLAN
[FreeTextEntry1] : a controlled substances agreement was completed, after checking I-STOP I renewed his medications and issued a 3 day rx for oxycodone, he will take it only as needed and never when driving, he will apply ice to the next for the next 48 hours and then heat and will call for a PT referral if the pain persists and follow up in 4-6 weeks

## 2020-09-05 NOTE — HISTORY OF PRESENT ILLNESS
[FreeTextEntry1] : Patient present for medication renewal\par pt c/o hurting his back and neck while playing horsehoes \par  [de-identified] : Two days ago he was playing horseshoes and developed sudden pain in the left side of his neck and upper back.  He has been taking tylenol and ibuprofen without relief and the pain is keeping him awake.  His fiancee had a baby 7 weeks ago and he is very happy and his anxiety is stable.

## 2020-12-22 ENCOUNTER — RX RENEWAL (OUTPATIENT)
Age: 40
End: 2020-12-22

## 2021-01-08 ENCOUNTER — NON-APPOINTMENT (OUTPATIENT)
Age: 41
End: 2021-01-08

## 2021-01-26 ENCOUNTER — TRANSCRIPTION ENCOUNTER (OUTPATIENT)
Age: 41
End: 2021-01-26

## 2021-01-27 ENCOUNTER — APPOINTMENT (OUTPATIENT)
Dept: FAMILY MEDICINE | Facility: CLINIC | Age: 41
End: 2021-01-27

## 2021-01-27 ENCOUNTER — NON-APPOINTMENT (OUTPATIENT)
Age: 41
End: 2021-01-27

## 2021-01-31 ENCOUNTER — RX RENEWAL (OUTPATIENT)
Age: 41
End: 2021-01-31

## 2021-02-03 ENCOUNTER — APPOINTMENT (OUTPATIENT)
Dept: FAMILY MEDICINE | Facility: CLINIC | Age: 41
End: 2021-02-03
Payer: COMMERCIAL

## 2021-02-03 VITALS
WEIGHT: 167 LBS | DIASTOLIC BLOOD PRESSURE: 80 MMHG | HEART RATE: 87 BPM | SYSTOLIC BLOOD PRESSURE: 128 MMHG | HEIGHT: 71 IN | OXYGEN SATURATION: 98 % | TEMPERATURE: 98.8 F | RESPIRATION RATE: 14 BRPM | BODY MASS INDEX: 23.38 KG/M2

## 2021-02-03 DIAGNOSIS — S46.811A STRAIN OF OTHER MUSCLES, FASCIA AND TENDONS AT SHOULDER AND UPPER ARM LEVEL, RIGHT ARM, INITIAL ENCOUNTER: ICD-10-CM

## 2021-02-03 DIAGNOSIS — S46.812A STRAIN OF OTHER MUSCLES, FASCIA AND TENDONS AT SHOULDER AND UPPER ARM LEVEL, LEFT ARM, INITIAL ENCOUNTER: ICD-10-CM

## 2021-02-03 DIAGNOSIS — K29.00 ACUTE GASTRITIS W/OUT BLEEDING: ICD-10-CM

## 2021-02-03 PROCEDURE — 99214 OFFICE O/P EST MOD 30 MIN: CPT

## 2021-02-03 PROCEDURE — 99072 ADDL SUPL MATRL&STAF TM PHE: CPT

## 2021-02-03 RX ORDER — OXYCODONE 5 MG/1
5 TABLET ORAL EVERY 6 HOURS
Qty: 12 | Refills: 0 | Status: DISCONTINUED | COMMUNITY
Start: 2020-09-05 | End: 2021-02-03

## 2021-02-03 NOTE — PLAN
[FreeTextEntry1] : the hospital records including CBC, CMP, lipid panel, respiratory pathogen panel, Covid swab and CT report were reviewed, no medication changes were made, he was given a note to return to work on 2/8, he will follow up with GI as scheduled, I-STOP was checked and clonazepam renewed and he will follow up in 2 months or sooner prn

## 2021-02-03 NOTE — PHYSICAL EXAM
[No Acute Distress] : no acute distress [Well Nourished] : well nourished [Well Developed] : well developed [Well-Appearing] : well-appearing [Normal Voice/Communication] : normal voice/communication [Supple] : supple [No Respiratory Distress] : no respiratory distress  [No Accessory Muscle Use] : no accessory muscle use [Clear to Auscultation] : lungs were clear to auscultation bilaterally [Normal Rate] : normal rate  [Regular Rhythm] : with a regular rhythm [Normal S1, S2] : normal S1 and S2 [No Murmur] : no murmur heard [Soft] : abdomen soft [Non-distended] : non-distended [Coordination Grossly Intact] : coordination grossly intact [Normal Mood] : the mood was normal [de-identified] : mild generalized tenderness on palpation

## 2021-02-03 NOTE — REVIEW OF SYSTEMS
[Fever] : no fever [Fatigue] : fatigue [Shortness Of Breath] : no shortness of breath [Cough] : cough [Abdominal Pain] : abdominal pain [Nausea] : nausea [Vomiting] : vomiting [Melena] : melrosa maria [Muscle Pain] : muscle pain [Memory Loss] : memory loss [Suicidal] : not suicidal [Anxiety] : anxiety [Depression] : no depression [Negative] : Heme/Lymph

## 2021-02-03 NOTE — HISTORY OF PRESENT ILLNESS
[FreeTextEntry1] : patient presents to follow up from [facility] [de-identified] : He was diagnosed with Covid 3 weeks ago.  He still feels fatigued and has difficulty with his short term memory.  He was in the ER from 1/26-1/27 with vomiting up blood.  He had a virtual visit with Dr Quiros today and will schedule a upper endoscopy and colonoscopy.  He is feeling better with the abdominal pain, his BM's are normal in consistency but do still have blood.  Overall his anxiety is stable.

## 2021-02-17 ENCOUNTER — NON-APPOINTMENT (OUTPATIENT)
Age: 41
End: 2021-02-17

## 2021-02-18 ENCOUNTER — TRANSCRIPTION ENCOUNTER (OUTPATIENT)
Age: 41
End: 2021-02-18

## 2021-03-01 ENCOUNTER — RX RENEWAL (OUTPATIENT)
Age: 41
End: 2021-03-01

## 2021-03-20 ENCOUNTER — APPOINTMENT (OUTPATIENT)
Dept: FAMILY MEDICINE | Facility: CLINIC | Age: 41
End: 2021-03-20
Payer: COMMERCIAL

## 2021-03-20 VITALS
RESPIRATION RATE: 14 BRPM | OXYGEN SATURATION: 99 % | SYSTOLIC BLOOD PRESSURE: 134 MMHG | WEIGHT: 174 LBS | HEART RATE: 90 BPM | HEIGHT: 71 IN | DIASTOLIC BLOOD PRESSURE: 70 MMHG | BODY MASS INDEX: 24.36 KG/M2

## 2021-03-20 PROCEDURE — 99072 ADDL SUPL MATRL&STAF TM PHE: CPT

## 2021-03-20 PROCEDURE — 99213 OFFICE O/P EST LOW 20 MIN: CPT

## 2021-03-20 NOTE — ASSESSMENT
[FreeTextEntry1] : check knee xray. and left knee mri knee no contrast ro internal derangement. \par follow up with result. \par get knee immobilizer, ice \par Renew Narcotics. ISTOP checked. \par follow up orthopedic.\par go to er if worse pain

## 2021-03-20 NOTE — HISTORY OF PRESENT ILLNESS
[FreeTextEntry8] : patient c/o of left knee pain, +black and blue, +swollen X 1 day \par \par I was feeding my snake and a mice got loose and I was looking for it going on my hands and knees\par I really don’t know how this happened. I woke up in the middle of the night to go to the bathroom and i almost fell\par  this is a 10/10 pain.

## 2021-03-20 NOTE — PHYSICAL EXAM
[Normal] : normal rate, regular rhythm, normal S1 and S2 and no murmur heard [de-identified] : left knee pain in medical meniscus  bruising  severe pain  no swelling.

## 2021-03-21 ENCOUNTER — TRANSCRIPTION ENCOUNTER (OUTPATIENT)
Age: 41
End: 2021-03-21

## 2021-04-06 ENCOUNTER — APPOINTMENT (OUTPATIENT)
Dept: FAMILY MEDICINE | Facility: CLINIC | Age: 41
End: 2021-04-06

## 2021-05-13 ENCOUNTER — TRANSCRIPTION ENCOUNTER (OUTPATIENT)
Age: 41
End: 2021-05-13

## 2021-07-07 ENCOUNTER — RX RENEWAL (OUTPATIENT)
Age: 41
End: 2021-07-07

## 2021-07-14 ENCOUNTER — RESULT REVIEW (OUTPATIENT)
Age: 41
End: 2021-07-14

## 2021-07-15 ENCOUNTER — RX RENEWAL (OUTPATIENT)
Age: 41
End: 2021-07-15

## 2021-07-29 ENCOUNTER — RX RENEWAL (OUTPATIENT)
Age: 41
End: 2021-07-29

## 2021-08-10 ENCOUNTER — APPOINTMENT (OUTPATIENT)
Dept: FAMILY MEDICINE | Facility: CLINIC | Age: 41
End: 2021-08-10
Payer: COMMERCIAL

## 2021-08-10 VITALS
SYSTOLIC BLOOD PRESSURE: 120 MMHG | HEIGHT: 71 IN | BODY MASS INDEX: 23.8 KG/M2 | WEIGHT: 170 LBS | RESPIRATION RATE: 14 BRPM | HEART RATE: 80 BPM | OXYGEN SATURATION: 98 % | DIASTOLIC BLOOD PRESSURE: 78 MMHG

## 2021-08-10 VITALS — TEMPERATURE: 98.6 F

## 2021-08-10 DIAGNOSIS — G47.00 INSOMNIA, UNSPECIFIED: ICD-10-CM

## 2021-08-10 DIAGNOSIS — T50.B95A OTHER FATIGUE: ICD-10-CM

## 2021-08-10 DIAGNOSIS — B00.1 HERPESVIRAL VESICULAR DERMATITIS: ICD-10-CM

## 2021-08-10 DIAGNOSIS — F41.9 ANXIETY DISORDER, UNSPECIFIED: ICD-10-CM

## 2021-08-10 DIAGNOSIS — R53.83 OTHER FATIGUE: ICD-10-CM

## 2021-08-10 PROCEDURE — 99213 OFFICE O/P EST LOW 20 MIN: CPT

## 2021-08-10 PROCEDURE — 99072 ADDL SUPL MATRL&STAF TM PHE: CPT

## 2021-08-10 RX ORDER — MULTIVITAMIN
TABLET ORAL
Refills: 0 | Status: DISCONTINUED | COMMUNITY
End: 2021-08-10

## 2021-08-10 RX ORDER — CHLORHEXIDINE GLUCONATE 4 %
1000 LIQUID (ML) TOPICAL
Refills: 0 | Status: DISCONTINUED | COMMUNITY
End: 2021-08-10

## 2021-08-10 RX ORDER — ONDANSETRON 8 MG/1
8 TABLET, ORALLY DISINTEGRATING ORAL
Refills: 0 | Status: DISCONTINUED | COMMUNITY
End: 2021-08-10

## 2021-08-10 RX ORDER — MULTIVIT-MIN/IRON/FOLIC ACID/K 18-600-40
CAPSULE ORAL
Refills: 0 | Status: DISCONTINUED | COMMUNITY
End: 2021-08-10

## 2021-08-10 RX ORDER — SUCRALFATE 1 G/1
1 TABLET ORAL
Refills: 0 | Status: DISCONTINUED | COMMUNITY
End: 2021-08-10

## 2021-08-10 RX ORDER — MELATONIN 3 MG
TABLET ORAL
Refills: 0 | Status: DISCONTINUED | COMMUNITY
End: 2021-08-10

## 2021-08-10 RX ORDER — PANTOPRAZOLE 40 MG/1
40 TABLET, DELAYED RELEASE ORAL TWICE DAILY
Refills: 0 | Status: DISCONTINUED | COMMUNITY
End: 2021-08-10

## 2021-08-10 RX ORDER — OXYCODONE AND ACETAMINOPHEN 10; 325 MG/1; MG/1
10-325 TABLET ORAL
Qty: 28 | Refills: 0 | Status: DISCONTINUED | COMMUNITY
Start: 2021-03-20 | End: 2021-08-10

## 2021-08-10 RX ORDER — ASCORBIC ACID 500 MG
TABLET ORAL
Refills: 0 | Status: ACTIVE | COMMUNITY

## 2021-08-10 NOTE — HISTORY OF PRESENT ILLNESS
[FreeTextEntry1] : pt presents for med follow up  [de-identified] : He is moving to [place] in a few weeks but will still come in for office visits.  He is doing well on his medications and denies any side effects.  He isn't too satisfied with the tadalafil, it sometimes causes an erection the next day not when he wants it to happen. no concerns

## 2021-08-10 NOTE — PLAN
[FreeTextEntry1] : he declined taking daily valtrex, he declined a change off of tadalafil, I-STOP was checked and clonazepam was renewed and he will follow up in 3-4 months or sooner prn Airway patent, Nasal mucosa clear. Mouth with normal mucosa. Throat has no vesicles, no oropharyngeal exudates and uvula is midline.

## 2021-08-10 NOTE — REVIEW OF SYSTEMS
[Insomnia] : insomnia [Anxiety] : anxiety [Negative] : Heme/Lymph [Suicidal] : not suicidal [Depression] : no depression [FreeTextEntry4] : cold sores in right nostril [FreeTextEntry8] : decreased strength of erections

## 2021-08-19 DIAGNOSIS — B36.9 SUPERFICIAL MYCOSIS, UNSPECIFIED: ICD-10-CM

## 2021-08-19 RX ORDER — NYSTATIN 100000 [USP'U]/G
100000 POWDER TOPICAL
Qty: 180 | Refills: 3 | Status: ACTIVE | COMMUNITY
Start: 2019-08-23 | End: 1900-01-01

## 2021-12-06 RX ORDER — TRAZODONE HYDROCHLORIDE 100 MG/1
100 TABLET ORAL
Qty: 90 | Refills: 3 | Status: ACTIVE | COMMUNITY
Start: 2018-10-03 | End: 1900-01-01

## 2021-12-06 RX ORDER — BUPROPION HYDROCHLORIDE 200 MG/1
200 TABLET, FILM COATED, EXTENDED RELEASE ORAL
Qty: 90 | Refills: 1 | Status: ACTIVE | COMMUNITY
Start: 2020-01-29 | End: 1900-01-01

## 2021-12-28 ENCOUNTER — APPOINTMENT (OUTPATIENT)
Dept: FAMILY MEDICINE | Facility: CLINIC | Age: 41
End: 2021-12-28

## 2022-01-20 RX ORDER — TADALAFIL 20 MG/1
20 TABLET ORAL
Qty: 12 | Refills: 3 | Status: ACTIVE | COMMUNITY
Start: 2019-08-05 | End: 1900-01-01

## 2022-01-20 RX ORDER — VALACYCLOVIR 1 G/1
1 TABLET, FILM COATED ORAL
Qty: 24 | Refills: 3 | Status: ACTIVE | COMMUNITY
Start: 2019-01-17 | End: 1900-01-01

## 2022-01-25 RX ORDER — CLONAZEPAM 2 MG/1
2 TABLET ORAL
Qty: 45 | Refills: 0 | Status: ACTIVE | COMMUNITY
Start: 2017-11-27 | End: 1900-01-01

## 2022-12-06 ENCOUNTER — TRANSCRIPTION ENCOUNTER (OUTPATIENT)
Age: 42
End: 2022-12-06

## 2023-04-04 ENCOUNTER — APPOINTMENT (OUTPATIENT)
Dept: ORTHOPEDIC SURGERY | Facility: CLINIC | Age: 43
End: 2023-04-04
Payer: OTHER MISCELLANEOUS

## 2023-04-04 VITALS — BODY MASS INDEX: 21 KG/M2 | HEIGHT: 71 IN | WEIGHT: 150 LBS

## 2023-04-04 DIAGNOSIS — M25.562 PAIN IN LEFT KNEE: ICD-10-CM

## 2023-04-04 PROCEDURE — 99213 OFFICE O/P EST LOW 20 MIN: CPT

## 2023-04-04 PROCEDURE — 73562 X-RAY EXAM OF KNEE 3: CPT | Mod: LT

## 2023-04-04 NOTE — PHYSICAL EXAM
[5___] : hamstring 5[unfilled]/5 [Left] : left knee [AP] : anteroposterior [Lateral] : lateral [[place]] : skyline [] : no crepitus [FreeTextEntry8] : discomfort to medial palpation [de-identified] : slight click with mcmramnoays [de-identified] : VARUS KNEE, AMBULATING WELL [FreeTextEntry9] : no significant osteophyte formation, joint space maintained  [TWNoteComboBox7] : flexion 130 degrees [de-identified] : extension 0 degrees

## 2023-04-04 NOTE — HISTORY OF PRESENT ILLNESS
[de-identified] : Date of Injury/Onset:      03/19/2020    INJURY\par Pain: At Rest: 3 /10   \par With Activity: 8 /10 \par Affecting Sleep:N\par Difficulty with stairs:Y\par Difficulty getting in and out of car:Y\par Sit to stand stiffness:Y\par Mechanism of injury: FELT A POP/STRAIN IN LEFT KNEE  \par This is a Work Related Injury being treated under Worker's Compensation.\par This is not   an athletic injury occurring associated with an interscholastic or organized sports team.\par Quality of symptoms: C/O THROBBING PAIN , MEDIAL AND KNEE CAP PAIN, NO INSTABILITY, NO SWELLING NOW. \par Improves with:    REST\par Worse with:   BEING ON FEET ALL DAY \par Previous Treatment/Imaging/Studies Since Last Visit: PT, USED PERCOCET, MOTRIN EVERYDAY NOW, S/P LEFT KNEE SCOPE 7/14/21(bjm)\par Reports Available For Review Today: NONE\par PATIENT IS WORKING \par \par \par

## 2023-04-04 NOTE — DISCUSSION/SUMMARY
[de-identified] : pt has good ROM and strength, stable knee, ligaments are in tact, \par Discussed MRI of knee, but will not proceed at this time\par Recommend strengthening to improve confidence in left knee and to increase activity; recommend stationary bike \par Plan is for SLU \par SLU is 5%\par \par pt reports he lives in [place] now

## 2023-10-01 PROBLEM — Z79.899 MEDICAL MARIJUANA USE: Status: RESOLVED | Noted: 2018-05-24 | Resolved: 2023-10-01
